# Patient Record
Sex: FEMALE | Race: WHITE | NOT HISPANIC OR LATINO | Employment: FULL TIME | URBAN - METROPOLITAN AREA
[De-identification: names, ages, dates, MRNs, and addresses within clinical notes are randomized per-mention and may not be internally consistent; named-entity substitution may affect disease eponyms.]

---

## 2017-06-08 ENCOUNTER — OFFICE VISIT (OUTPATIENT)
Dept: RHEUMATOLOGY | Facility: CLINIC | Age: 52
End: 2017-06-08
Payer: COMMERCIAL

## 2017-06-08 ENCOUNTER — HOSPITAL ENCOUNTER (OUTPATIENT)
Dept: RADIOLOGY | Facility: HOSPITAL | Age: 52
Discharge: HOME | End: 2017-06-08
Attending: INTERNAL MEDICINE
Payer: COMMERCIAL

## 2017-06-08 VITALS
BODY MASS INDEX: 36.33 KG/M2 | SYSTOLIC BLOOD PRESSURE: 120 MMHG | DIASTOLIC BLOOD PRESSURE: 80 MMHG | HEIGHT: 68 IN | WEIGHT: 239.69 LBS | HEART RATE: 60 BPM

## 2017-06-08 DIAGNOSIS — M25.542 ARTHRALGIA OF BOTH HANDS: ICD-10-CM

## 2017-06-08 DIAGNOSIS — M25.561 ARTHRALGIA OF BOTH KNEES: ICD-10-CM

## 2017-06-08 DIAGNOSIS — G56.03 BILATERAL CARPAL TUNNEL SYNDROME: Primary | ICD-10-CM

## 2017-06-08 DIAGNOSIS — M25.541 ARTHRALGIA OF BOTH HANDS: ICD-10-CM

## 2017-06-08 DIAGNOSIS — M25.562 ARTHRALGIA OF BOTH KNEES: ICD-10-CM

## 2017-06-08 DIAGNOSIS — M79.10 MYALGIA: ICD-10-CM

## 2017-06-08 PROCEDURE — 99204 OFFICE O/P NEW MOD 45 MIN: CPT | Mod: S$GLB,,, | Performed by: INTERNAL MEDICINE

## 2017-06-08 PROCEDURE — 73562 X-RAY EXAM OF KNEE 3: CPT | Mod: 50,TC,PO

## 2017-06-08 PROCEDURE — 99999 PR PBB SHADOW E&M-NEW PATIENT-LVL III: CPT | Mod: PBBFAC,,, | Performed by: INTERNAL MEDICINE

## 2017-06-08 PROCEDURE — 73562 X-RAY EXAM OF KNEE 3: CPT | Mod: 26,50,, | Performed by: RADIOLOGY

## 2017-06-08 RX ORDER — IBUPROFEN 600 MG/1
600 TABLET ORAL EVERY 8 HOURS PRN
Qty: 60 TABLET | Refills: 11 | Status: SHIPPED | OUTPATIENT
Start: 2017-06-08 | End: 2018-06-08

## 2017-06-08 RX ORDER — ACETAMINOPHEN 500 MG
500 TABLET ORAL EVERY 6 HOURS PRN
COMMUNITY

## 2017-06-08 RX ORDER — GABAPENTIN 300 MG/1
300 CAPSULE ORAL NIGHTLY
Qty: 30 CAPSULE | Refills: 3 | Status: SHIPPED | OUTPATIENT
Start: 2017-06-08 | End: 2017-07-08

## 2017-06-08 ASSESSMENT — ROUTINE ASSESSMENT OF PATIENT INDEX DATA (RAPID3)
MDHAQ FUNCTION SCORE: .9
TOTAL RAPID3 SCORE: 3.67
PAIN SCORE: 4
PATIENT GLOBAL ASSESSMENT SCORE: 4
PSYCHOLOGICAL DISTRESS SCORE: 3.3
AM STIFFNESS SCORE: 0, NO
FATIGUE SCORE: 4

## 2017-06-08 NOTE — PROGRESS NOTES
Subjective:          Chief Complaint: Maryam Chavez is a 52 y.o. female who had concerns including Disease Management.    HPI:  Britney Avlinlier daughter.  Patient is having pain in her arms. She is having to sleep propped on pillows. She notes they are numb in the AM from fingers to elbow. Whole hand feelis numb. Cannot sleep on side. No numbness during the day. Hurts during the day. She is starting to drop items during the day. Fingers are stiff and hurt sometime as well. She notes some swelling in hands. Stiffness in worse int he morning. She has tenderness to light touch along back, arms, legs. Shoulder are painful.     Knees are painful. No swelling. Used to hurt just with sqautting now having more frequent pain. Still intermittent but frequent. She was anemic with pregnancy only. Hx of hysterectomy for menorrhagia. Worse with use. Worse with sitting.   No Raynaud's, no dry eye no dry mouth. No lung disease. No pleurisy. No IBD. Hx of kidney stones x 1. No real photosensitive rash but slight erythema of the chest.   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       REVIEW OF SYSTEMS:    Review of Systems   Constitutional: Negative for fever, malaise/fatigue and weight loss.   HENT: Negative for sore throat.    Eyes: Negative for double vision, photophobia and redness.   Respiratory: Negative for cough, shortness of breath and wheezing.    Cardiovascular: Negative for chest pain, palpitations and orthopnea.   Gastrointestinal: Negative for abdominal pain, constipation and diarrhea.   Genitourinary: Negative for dysuria, hematuria and urgency.   Musculoskeletal: Positive for joint pain. Negative for back pain and myalgias.   Skin: Negative for rash.   Neurological: Negative for dizziness, tingling, focal weakness and headaches.   Endo/Heme/Allergies: Does not bruise/bleed easily.   Psychiatric/Behavioral: Negative for depression, hallucinations and suicidal ideas.                Objective:            History reviewed. No pertinent past medical history.  Family History   Problem Relation Age of Onset    Rheum arthritis Mother     Lupus Mother     Kidney disease Mother     Thyroid disease Mother     Doniphan's disease Mother     Sjogren's syndrome Mother     Heart disease Mother     Severe combined immunodeficiency Mother     Hypertension Father     Diabetes Mellitus Father     Osteoarthritis Father     Diabetes Mellitus Sister     Hypertension Sister     Osteoarthritis Sister     Hypertension Brother     Gout Brother     Hypertension Maternal Grandmother     Osteoarthritis Maternal Grandmother     Heart disease Maternal Grandmother     Cancer Maternal Grandfather     Heart disease Maternal Grandfather     Heart disease Paternal Grandmother     Osteoarthritis Paternal Grandmother      Social History   Substance Use Topics    Smoking status: Former Smoker     Packs/day: 0.25     Years: 30.00     Types: Cigarettes    Smokeless tobacco: Not on file    Alcohol use No         No current outpatient prescriptions on file prior to visit.     No current facility-administered medications on file prior to visit.        Vitals:    06/08/17 1150   BP: 120/80   Pulse: 60       Physical Exam:    Physical Exam   Constitutional: She appears well-developed and well-nourished.   HENT:   Nose: No septal deviation.   Mouth/Throat: Mucous membranes are normal. No oral lesions.   Eyes: Pupils are equal, round, and reactive to light. Right conjunctiva is not injected. Left conjunctiva is not injected.   Neck: No JVD present. No thyroid mass and no thyromegaly present.   Cardiovascular: Normal rate, regular rhythm and normal pulses.    No edema   Pulmonary/Chest: Effort normal and breath sounds normal.   Abdominal: Soft. Normal appearance. There is no hepatosplenomegaly.   Musculoskeletal:        Right shoulder: She exhibits normal range of motion, no tenderness and no swelling.         Left shoulder: She exhibits normal range of motion, no tenderness and no swelling.        Right elbow: She exhibits normal range of motion and no swelling. No tenderness found.        Left elbow: She exhibits normal range of motion and no swelling. No tenderness found.        Right wrist: She exhibits normal range of motion, no tenderness and no swelling.        Left wrist: She exhibits normal range of motion, no tenderness and no swelling.        Right hip: She exhibits normal range of motion, normal strength and no swelling.        Left hip: She exhibits normal range of motion, no tenderness and no swelling.        Right knee: She exhibits normal range of motion and no swelling. Tenderness found.        Left knee: She exhibits normal range of motion and no swelling. Tenderness found.        Right ankle: She exhibits normal range of motion and no swelling. No tenderness.        Left ankle: She exhibits normal range of motion and no swelling. No tenderness.        Right hand: She exhibits tenderness.        Left hand: She exhibits tenderness.   + tinels bilaterally at wrist  Knees with crepitation  Myalgias upper trapezius and cervical paraspinal muscles.   Lymphadenopathy:     She has no cervical adenopathy.     She has no axillary adenopathy.   Neurological: She has normal strength and normal reflexes.   Skin: Skin is dry and intact.   Psychiatric: She has a normal mood and affect.             Assessment:       Encounter Diagnoses   Name Primary?    Bilateral carpal tunnel syndrome Yes    Arthralgia of both knees     Arthralgia of both hands     Myalgia           Plan:        Bilateral carpal tunnel syndrome  -     EMG w/ Ultrasound; Future  -     gabapentin (NEURONTIN) 300 MG capsule; Take 1 capsule (300 mg total) by mouth every evening.  Dispense: 30 capsule; Refill: 3    Arthralgia of both knees  -     ibuprofen (ADVIL,MOTRIN) 600 MG tablet; Take 1 tablet (600 mg total) by mouth every 8 (eight) hours  as needed for Pain.  Dispense: 60 tablet; Refill: 11  -     X-Ray Knee 3 View Bilateral; Future; Expected date: 06/08/2017  -     Rheumatoid factor; Future; Expected date: 06/20/2017  -     MAY; Future; Expected date: 06/08/2017    Arthralgia of both hands  -     ibuprofen (ADVIL,MOTRIN) 600 MG tablet; Take 1 tablet (600 mg total) by mouth every 8 (eight) hours as needed for Pain.  Dispense: 60 tablet; Refill: 11  -     Rheumatoid factor; Future; Expected date: 06/20/2017  -     MAY; Future; Expected date: 06/08/2017    Myalgia  -     ibuprofen (ADVIL,MOTRIN) 600 MG tablet; Take 1 tablet (600 mg total) by mouth every 8 (eight) hours as needed for Pain.  Dispense: 60 tablet; Refill: 11    Very pleasant 53 y/o female with what appears to be as above some CTS, with underlying OA and likely muscle spasm with exacerbation from sleep deprivation.   No evidence of RA, but given fmhx will check serologies  Return in about 3 months (around 9/8/2017).          40min consultation with greater than 50% spent in counseling, chart review and coordination of care. All questions answered.

## 2017-07-13 ENCOUNTER — PROCEDURE VISIT (OUTPATIENT)
Dept: PHYSICAL MEDICINE AND REHAB | Facility: CLINIC | Age: 52
End: 2017-07-13
Payer: COMMERCIAL

## 2017-07-13 DIAGNOSIS — G56.03 BILATERAL CARPAL TUNNEL SYNDROME: ICD-10-CM

## 2017-07-13 PROCEDURE — 95911 NRV CNDJ TEST 9-10 STUDIES: CPT | Mod: 26,,, | Performed by: PHYSICAL MEDICINE & REHABILITATION

## 2017-07-13 PROCEDURE — 95886 MUSC TEST DONE W/N TEST COMP: CPT | Mod: 26,,, | Performed by: PHYSICAL MEDICINE & REHABILITATION

## 2017-07-16 NOTE — PROCEDURES
[facility]  [address]  [place] [address]             Full Name: MARIA D BURDICK Gender: Female  Patient ID: [id number] YOB: 1965  History: Pain in bilateral arms (right worse than left) for 3 years, progressively worsening. Described as tingling/ burning that's located over lateral elbow and radiates up to shoulder. Associated with weakness with picking up objects. No numbness or decreased sensation.  Has been taking gabapentin for 1 month, which has been helping.   No Hx of diabetes or hypothyroidism.      Visit Date: 7/13/2017 15:45  Age: 52 Years 2 Months Old  Examining Physician: CARISSA  Referring Physician: JOVANNY MONTES MD      Sensory NCS      Nerve / Sites Rec. Site Onset Lat Peak Lat NP Amp PP Amp Segments Distance Velocity     ms ms µV µV  cm m/s   L Median - Digit III (Antidromic)      Wrist Dig III 2.76 3.65 23.4 47.7 Wrist - Dig III 14 51      Mid palm Dig III 1.30 1.77 26.0 56.9 Mid palm - Dig III 7 54   R Median - Digit III (Antidromic)      Wrist Dig III 2.92 3.70 24.4 36.3 Wrist - Dig III 14 48      Mid palm Dig III 1.09 1.72 45.0 36.8 Mid palm - Dig III 7 64   L Ulnar - Digit V (Antidromic)      Wrist Dig V 2.50 3.39 15.1 14.4 Wrist - Dig V 14 56   R Ulnar - Digit V (Antidromic)      Wrist Dig V 2.55 3.39 12.9 24.0 Wrist - Dig V 14 55       Combined Sensory Index      Nerve / Sites Rec. Site Peak Lat NP Amp PP Amp Segments Peak Diff     ms µV µV  ms   L Median - CSI      Median Ring 3.70 11.4 10.9 Median palm - Ulnar palm 0.73      Ulnar Ring 3.28 14.8 25.6        Median palm Wrist 2.24 67.1 33.2        Ulnar palm Wrist 1.51 8.3 11.1        CSI     CSI 1.15   R Median - CSI      Median Thumb 3.02 14.0 33.4 Median - Radial 0.73      Radial Thumb 2.29 12.2 5.3 Median - Ulnar 0.52      Median Ring 3.75 11.5 12.4 Median palm - Ulnar palm       Ulnar Ring 3.23 10.5 6.9        CSI     CSI 1.25       Motor NCS      Nerve / Sites Muscle Latency Amplitude Amp % Duration  Segments Distance Lat Diff Velocity     ms mV % ms  cm ms m/s   R Median - APB      Wrist APB 3.65 11.5 100 7.14 Wrist - APB 8        Elbow APB 8.33 12.4 107 7.19 Elbow - Wrist 25 4.69 53   L Median - APB      Wrist APB 3.85 9.3 100 6.93 Wrist - APB 8        Elbow APB 8.85 7.9 84.5 7.55 Elbow - Wrist 25 5.00 50   R Ulnar - ADM      Wrist ADM 3.07 10.3 100 6.61 Wrist - ADM 8        B.Elbow ADM 6.20 9.8 95.1 6.77 B.Elbow - Wrist 19 3.12 61      A.Elbow ADM 7.76 9.9 96.2 6.61 A.Elbow - B.Elbow 10 1.56 64   L Ulnar - ADM      Wrist ADM 2.66 9.7 100 7.03 Wrist - ADM 8        B.Elbow ADM 5.83 8.9 91.4 7.08 B.Elbow - Wrist 20 3.18 63      A.Elbow ADM 7.34 9.9 102 7.14 A.Elbow - B.Elbow 10 1.51 66       EMG         EMG Summary Table     Spontaneous MUAP Recruitment   Muscle IA Fib PSW Fasc H.F. Amp Dur. PPP Pattern   R. Deltoid N None None None None N N N N   R. Biceps brachii N None None None None N N N N   R. Triceps brachii N None None None None N N N N   R. Pronator teres N None None None None N N N N   R. First dorsal interosseous N None None None None N N N N   R. Abductor pollicis brevis N None None None None N N N N   R. Cervical paraspinals N None None None None N N N N   L. Deltoid N None None None None N N N N   L. Biceps brachii N None None None None N N N N   L. Triceps brachii N None None None None N N N N   L. Pronator teres N None None None None N N N N   L. First dorsal interosseous N None None None None N N N N   L. Abductor pollicis brevis N None None None None N N N N   L. Cervical paraspinals N None None None None N N N N       Summary    The motor conduction test was normal in all 4 of the tested nerves: R Median - APB, L Median - APB, R Ulnar - ADM, L Ulnar - ADM.    The sensory conduction test was performed on 6 nerve(s). There were results within the normal range in 2 nerves: L Ulnar - Digit V (Antidromic), R Ulnar - Digit V (Antidromic). Findings were unremarkable in 2 nerve(s): L Median - CSI, R  Median - CSI. Results outside the specified normal range were found in 2 nerve(s), as follows:   In the L Median - Digit III (Antidromic) study  o the peak latency result was increased for Wrist stimulation   In the R Median - Digit III (Antidromic) study  o the peak latency result was increased for Wrist stimulation    The needle EMG study was normal in all 14 tested muscles: R. Deltoid, R. Biceps brachii, R. Triceps brachii, R. Pronator teres, R. First dorsal interosseous, R. Abductor pollicis brevis, R. Cervical paraspinals, L. Deltoid, L. Biceps brachii, L. Triceps brachii, L. Pronator teres, L. First dorsal interosseous, L. Abductor pollicis brevis, L. Cervical paraspinals.      Electrodiagnostic Impression:  1. There was electrodiagnostic evidence of bilateral borderline mild median nerve neuropathy at the wrists.  2. There was no electrodiagnostic evidence of peripheral polyneuropathy, myopathy, or cervical radiculopathy/brachial plexopathy of the bilateral upper extremities.    Summary:  Borderline mild bilateral carpal tunnel syndrome.    Recommendations:  Todays electrodiagnostic test results showed borderline mild carpal tunnel syndrome, I recommend conservative treatment in the form of carpal tunnel wrist splints and injection therapy.  Todays test results will be sent to her referring physician, Dr. Dixon for further review and direction in her treatment.    Thank you very much for the referral. Please call if you have any questions regarding this study or the report.     -------------------------------  Bobby Butler M.D.

## 2017-07-19 ENCOUNTER — TELEPHONE (OUTPATIENT)
Dept: RHEUMATOLOGY | Facility: CLINIC | Age: 52
End: 2017-07-19

## 2017-07-19 NOTE — TELEPHONE ENCOUNTER
Please call patient as expected carpal tunnel syndrome we can continue with bracing and consider carpal tunnel injections in future if worsening.     Dr. CORTEZ

## 2019-12-11 ENCOUNTER — HOSPITAL ENCOUNTER (OUTPATIENT)
Dept: RADIOLOGY | Facility: HOSPITAL | Age: 54
Discharge: HOME | End: 2019-12-11
Attending: ORTHOPAEDIC SURGERY
Payer: COMMERCIAL

## 2019-12-11 ENCOUNTER — OFFICE VISIT (OUTPATIENT)
Dept: ORTHOPEDICS | Facility: CLINIC | Age: 54
End: 2019-12-11
Payer: COMMERCIAL

## 2019-12-11 VITALS — WEIGHT: 239 LBS | BODY MASS INDEX: 37.51 KG/M2 | HEIGHT: 67 IN

## 2019-12-11 DIAGNOSIS — M25.531 RIGHT WRIST PAIN: Primary | ICD-10-CM

## 2019-12-11 DIAGNOSIS — M25.531 RIGHT WRIST PAIN: ICD-10-CM

## 2019-12-11 PROCEDURE — 99203 PR OFFICE/OUTPT VISIT, NEW, LEVL III, 30-44 MIN: ICD-10-PCS | Mod: S$GLB,,, | Performed by: ORTHOPAEDIC SURGERY

## 2019-12-11 PROCEDURE — 99999 PR PBB SHADOW E&M-EST. PATIENT-LVL II: CPT | Mod: PBBFAC,,, | Performed by: ORTHOPAEDIC SURGERY

## 2019-12-11 PROCEDURE — 99203 OFFICE O/P NEW LOW 30 MIN: CPT | Mod: S$GLB,,, | Performed by: ORTHOPAEDIC SURGERY

## 2019-12-11 PROCEDURE — 73110 X-RAY EXAM OF WRIST: CPT | Mod: TC,PN,RT

## 2019-12-11 PROCEDURE — 99999 PR PBB SHADOW E&M-EST. PATIENT-LVL II: ICD-10-PCS | Mod: PBBFAC,,, | Performed by: ORTHOPAEDIC SURGERY

## 2019-12-11 PROCEDURE — 73090 X-RAY EXAM OF FOREARM: CPT | Mod: 26,RT,, | Performed by: RADIOLOGY

## 2019-12-11 PROCEDURE — 73110 X-RAY EXAM OF WRIST: CPT | Mod: 26,RT,, | Performed by: RADIOLOGY

## 2019-12-11 PROCEDURE — 73110 XR WRIST COMPLETE 3 VIEWS RIGHT: ICD-10-PCS | Mod: 26,RT,, | Performed by: RADIOLOGY

## 2019-12-11 PROCEDURE — 73090 X-RAY EXAM OF FOREARM: CPT | Mod: TC,PN,RT

## 2019-12-11 PROCEDURE — 3008F PR BODY MASS INDEX (BMI) DOCUMENTED: ICD-10-PCS | Mod: CPTII,S$GLB,, | Performed by: ORTHOPAEDIC SURGERY

## 2019-12-11 PROCEDURE — 73090 XR FOREARM RIGHT: ICD-10-PCS | Mod: 26,RT,, | Performed by: RADIOLOGY

## 2019-12-11 PROCEDURE — 3008F BODY MASS INDEX DOCD: CPT | Mod: CPTII,S$GLB,, | Performed by: ORTHOPAEDIC SURGERY

## 2019-12-11 RX ORDER — BUSPIRONE HYDROCHLORIDE 15 MG/1
15 TABLET ORAL 2 TIMES DAILY
Refills: 1 | COMMUNITY
Start: 2019-11-07

## 2019-12-11 RX ORDER — CITALOPRAM 20 MG/1
20 TABLET, FILM COATED ORAL
COMMUNITY
Start: 2019-07-06

## 2019-12-11 NOTE — PROGRESS NOTES
History reviewed. No pertinent past medical history.    Past Surgical History:   Procedure Laterality Date     SECTION      HYSTERECTOMY         Current Outpatient Medications   Medication Sig    acetaminophen (TYLENOL) 500 MG tablet Take 500 mg by mouth every 6 (six) hours as needed for Pain.    busPIRone (BUSPAR) 15 MG tablet Take 15 mg by mouth 2 (two) times daily.    citalopram (CELEXA) 20 MG tablet Take 20 mg by mouth.    gabapentin (NEURONTIN) 300 MG capsule Take 1 capsule (300 mg total) by mouth every evening.    ibuprofen (ADVIL,MOTRIN) 600 MG tablet Take 1 tablet (600 mg total) by mouth every 8 (eight) hours as needed for Pain.     No current facility-administered medications for this visit.        Review of patient's allergies indicates:  No Known Allergies    Family History   Problem Relation Age of Onset    Rheum arthritis Mother     Lupus Mother     Kidney disease Mother     Thyroid disease Mother     Kimball's disease Mother     Sjogren's syndrome Mother     Heart disease Mother     Severe combined immunodeficiency Mother     Hypertension Father     Diabetes Mellitus Father     Osteoarthritis Father     Diabetes Mellitus Sister     Hypertension Sister     Osteoarthritis Sister     Hypertension Brother     Gout Brother     Hypertension Maternal Grandmother     Osteoarthritis Maternal Grandmother     Heart disease Maternal Grandmother     Cancer Maternal Grandfather     Heart disease Maternal Grandfather     Heart disease Paternal Grandmother     Osteoarthritis Paternal Grandmother        Social History     Socioeconomic History    Marital status:      Spouse name: Not on file    Number of children: Not on file    Years of education: Not on file    Highest education level: Not on file   Occupational History    Not on file   Social Needs    Financial resource strain: Not on file    Food insecurity:     Worry: Not on file     Inability: Not on file     Transportation needs:     Medical: Not on file     Non-medical: Not on file   Tobacco Use    Smoking status: Former Smoker     Packs/day: 0.25     Years: 30.00     Pack years: 7.50     Types: Cigarettes   Substance and Sexual Activity    Alcohol use: No    Drug use: No    Sexual activity: Yes     Partners: Male   Lifestyle    Physical activity:     Days per week: Not on file     Minutes per session: Not on file    Stress: Not on file   Relationships    Social connections:     Talks on phone: Not on file     Gets together: Not on file     Attends [employer] service: Not on file     Active member of club or organization: Not on file     Attends meetings of clubs or organizations: Not on file     Relationship status: Not on file   Other Topics Concern    Not on file   Social History Narrative    Not on file       Chief Complaint:   Chief Complaint   Patient presents with    Right Wrist - Pain, Injury, Swelling       Consulting Physician: Self, Aaareferral    History of present illness:    This is a 54 y.o. year old female who complains of right wrist pain following a fall on 12 819.  She reports that she fell backwards and caught herself with her hands.  She was seen in urgent care put into a brace.  She reports pain at the 4/10 worse with activities along with some swelling.    Review of Systems:    Constitution: Denies chills, fever, and sweats.  HENT: Denies headaches or blurry vision.  Cardiovascular: Denies chest pain or irregular heart beat.  Respiratory: Denies cough or shortness of breath.  Gastrointestinal: Denies abdominal pain, nausea, or vomiting.  Musculoskeletal:  Denies muscle cramps.  Neurological: Denies dizziness or focal weakness.  Psychiatric/Behavioral: Normal mental status.  Hematologic/Lymphatic: Denies bleeding problem or easy bruising/bleeding.  Skin: Denies rash or suspicious lesions.    Examination:    Vital Signs:    Vitals:    12/11/19 1314   Weight: 108.4 kg (239 lb)   Height: 5'  "7" (1.702 m)   PainSc:   4       Body mass index is 37.43 kg/m².    This a well-developed, well nourished patient in no acute distress.    Alert and oriented x 3 and cooperative to examination.       Physical Exam: Right Wrist Exam    Skin  Scars:   None  Rash:   None    Inspection  Erythema:  None  Bruising:  None  Swelling:  Mild  Masses  None  Lymphadenopathy: None    Coordination:  Normal  Instability:  None    Range of Motion  Near full     Finger ROM:  Full    Strength:  Slightly weak     Tenderness  Radial:   None  Ulnar:   None  Snuffbox:  None  Volar   mild    Pulse:   2+ radial  Sensation:  Intact    Tinel's:   Negative  Finkelstein's:  Negative      Imaging: X-rays ordered and images interpreted today personally by me of right wrist and forearm show no acute bony abnormality, fracture dislocation.         Assessment: Right wrist pain        Plan:  She has a sprain of her right wrist.  We discussed therapy and she declined that.  She has a brace.  We have given her home exercise program.  We will see her back as needed.      DISCLAIMER: This note may have been dictated using voice recognition software and may contain grammatical errors.     NOTE: Consult report sent to referring provider via [facility] EMR.  "